# Patient Record
Sex: FEMALE | Race: WHITE
[De-identification: names, ages, dates, MRNs, and addresses within clinical notes are randomized per-mention and may not be internally consistent; named-entity substitution may affect disease eponyms.]

---

## 2018-08-27 ENCOUNTER — HOSPITAL ENCOUNTER (EMERGENCY)
Dept: HOSPITAL 45 - C.EDB | Age: 38
Discharge: HOME | End: 2018-08-27
Payer: COMMERCIAL

## 2018-08-27 VITALS — HEART RATE: 82 BPM | SYSTOLIC BLOOD PRESSURE: 130 MMHG | DIASTOLIC BLOOD PRESSURE: 96 MMHG | OXYGEN SATURATION: 100 %

## 2018-08-27 VITALS
HEIGHT: 67.01 IN | BODY MASS INDEX: 26.44 KG/M2 | WEIGHT: 168.43 LBS | BODY MASS INDEX: 26.44 KG/M2 | WEIGHT: 168.43 LBS | HEIGHT: 67.01 IN

## 2018-08-27 VITALS — TEMPERATURE: 98.6 F
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
## 2018-08-27 DIAGNOSIS — M25.461: ICD-10-CM

## 2018-08-27 DIAGNOSIS — Z79.899: ICD-10-CM

## 2018-08-27 DIAGNOSIS — F32.9: ICD-10-CM

## 2018-08-27 DIAGNOSIS — S89.91XA: Primary | ICD-10-CM

## 2018-08-27 DIAGNOSIS — E78.5: ICD-10-CM

## 2018-08-27 DIAGNOSIS — Z79.3: ICD-10-CM
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
## 2018-08-27 NOTE — EMERGENCY ROOM VISIT NOTE
History


First contact with patient:  17:29


Chief Complaint:  KNEEPAIN


Stated Complaint:  R KNEE INJURY





History of Present Illness


The patient is a 38 year old female who presents to the Emergency Room with 

complaints of right knee pain since the patient had an accident playing 

softball 2 days ago.  She fell landing directly on the right knee.  She is able 

to walk and bear weight.  The pain is worse with any flexion.  She has taken 

Tylenol with minimal relief of her symptoms.  She has not injured this knee 

before.  She is experiencing some pain into the right quad and right hip.





Review of Systems


6 system review negative. Please see pertinent positives in the history of 

present illness section.





Past Medical/Surgical History


Medical Problems:


(1) Endometriosis


(2) Migraines


(3) Vertigo


Hyperlipidemia


Depression





Family History





FHx: cancer


FHx: diabetes





Social History


Smoking Status:  Never Smoker


Alcohol Use:  none


Drug Use:  none


Marital Status:  single


Housing Status:  lives with family


Occupation Status:  employed





Current/Historical Medications


Scheduled


Atorvastatin (Lipitor), 20 MG PO DAILY


Birth Control Pills (Birth Control Pills), 1 TAB PO DAILY


Venlafaxine Hcl (Venlafaxine Hcl Er), 150 MG PO DAILY





Scheduled PRN


Lidocaine (Lidocaine), 1 PATCH TD QD PRN for Pain





Physical Exam


Vital Signs











  Date Time  Temp Pulse Resp B/P (MAP) Pulse Ox O2 Delivery O2 Flow Rate FiO2


 


8/27/18 20:11  82 17 130/96 100   


 


8/27/18 17:19 37.0 100 16 116/84 98 Room Air  











Physical Exam


VITALS: Vitals are noted on the nurse's note and reviewed by myself.  Vital 

signs stable.


GENERAL: 38-year-old female, in mild discomfort,, in no acute distress, 

nondiaphoretic, well-developed well-nourished.


SKIN: The skin was without rashes, erythema, edema, or bruising. .


HEAD: Normocephalic atraumatic.  


MUSCULOSKELETAL: 


RIGHT lower extremity: Pain with flexion and extension of the right knee.  

Patellar tendon intact.  Pain over the quadricep tendon noted.  Pain also over 

the medial joint line.  No ligamentous instability appreciated.  There is pain 

over the right hip flexor.  No pain over the IT band noted.  No pain with 

abduction.  Pain with flexion of the right hip.  DP pulse +2.  Capillary refill 

in the toes is less than 2 seconds.


NEURO: Patient was alert and oriented to person place and time.  Normal 

sensation to touch. No focal neurological deficits.





Medical Decision & Procedures


ER Provider


Diagnostic Interpretation:


Right knee x-rays


IMPRESSION: No definite fracture. Small-to-moderate right knee joint effusion


with equivocal lipohemarthrosis which is probably artifactual. However, if


persistent right knee pain, a CT could be obtained to exclude an occult


fracture.











Electronically signed by:  Uli Asher M.D.


8/27/2018 6:50 PM





Dictated Date/Time:  8/27/2018 6:46 PM





 The status of this report is Signed.   


 Draft = Not yet reviewed or approved by Radiologist.  


Signed = Reviewed and approved by Radiologist.





CT of the right lower extremity without contrast


IMPRESSION:  





1. No acute fracture.





2. Small right knee joint effusion.





3. Small amount of fluid overlying the medial head of the gastrocnemius which is


suboptimally assessed by CT but may reflect a muscle strain.





4. Mild lateral patellar tilt. 














Electronically signed by:  Uli Asher M.D.


8/27/2018 7:43 PM





Dictated Date/Time:  8/27/2018 7:35 PM





 The status of this report is Signed.   


 Draft = Not yet reviewed or approved by Radiologist.  


 Signed = Reviewed and approved by Radiologist.





Medications Administered











 Medications


  (Trade)  Dose


 Ordered  Sig/Destiney


 Route  Start Time


 Stop Time Status Last Admin


Dose Admin


 


 Ibuprofen


  (Advil Tab)  800 mg  NOW  STAT


 PO  8/27/18 17:44


 8/27/18 17:46 DC 8/27/18 17:52


800 MG











ED Course


The patient was seen and examined


She was medicated with ibuprofen 800 mg p.o.


Imaging was performed and reviewed


Upon reassessment, the patient was resting comfortably.  We discussed her 

results.  She voiced understanding, was comfortable being discharged home.


Discharge instructions were reviewed, and she was discharged in good condition





Medical Decision


Differential diagnosis: Knee sprain, ligamentous tear, effusion, fracture, 

dislocation, hip injury among others were entertained





This patient is a 38-year-old female presents to the emergency department with 

an injury to the right knee.  On exam, she was mildly tender over the joint 

line.  She had some mild edema.  I cannot elicit any significant abnormalities 

with the ligaments.  Her imaging showed a possible fracture and effusion.  CT 

confirmed no fracture.  She likely has a strain.  She also has a small 

effusion.  Patient will be treated with a knee immobilizer and crutches.  If 

there is no improvement in 7 days, she will either follow up with her primary 

care physician or an orthopedic doctor.  She was comfortable with this plan.





This chart was completed in part utilizing Dragon Speech Voice Recognition 

software. Attempts were made to minimize the grammatical errors, random word 

insertions, pronoun errors and incomplete sentences. Any formal questions or 

concerns about the content, text or information contained within the body of 

this dictation should be directly addressed to the provider for clarification.





Impression





 Primary Impression:  


 Right knee injury





Departure Information


Dispostion


Home / Self-Care





Condition


GOOD





Referrals


Tony Oden M.D.([facility]) (PCP)








Martell Suh M.D.





Patient Instructions


My [facility]





Additional Instructions





Please use crutches and knee immobilizer for the next 7 days.  If in 7 days, 

there is no improvement, please either follow-up with your primary care 

physician or an orthopedic doctor.  A number has been provided





Ibuprofen 600 mg every 6 hours.  Do not exceed 2400 mg in a 24-hour period.





Please also rest, ice for 20 minute intervals and elevate the leg as much as 

possible.





No strenuous activity until you are feeling better





Do not hesitate to return to the emergency department with any new or 

concerning symptoms





It was a pleasure participating in your care today

## 2018-08-27 NOTE — DIAGNOSTIC IMAGING REPORT
R KNEE 3 VIEWS



CLINICAL HISTORY: Medial right knee pain.    



COMPARISON: None



FINDINGS:  Alignment of the right knee is anatomic. No acute fracture or osseous

lesion is noted. A small to moderate right knee joint effusion is noted. Joint

spaces are preserved. An equivocal lipohemarthrosis is probably artifactual.



IMPRESSION: No definite fracture. Small-to-moderate right knee joint effusion

with equivocal lipohemarthrosis which is probably artifactual. However, if

persistent right knee pain, a CT could be obtained to exclude an occult

fracture.







Electronically signed by:  Uli Asher M.D.

8/27/2018 6:50 PM



Dictated Date/Time:  8/27/2018 6:46 PM

## 2018-08-27 NOTE — DIAGNOSTIC IMAGING REPORT
CT OF THE RIGHT KNEE WITHOUT CONTRAST



CLINICAL HISTORY: Medial right knee pain following injury.    



COMPARISON STUDY:  Right knee radiographs August 27, 2018.



TECHNIQUE: Axial images of the right knee were obtained without IV contrast.

Sagittal and coronal reconstructions were viewed.



FINDINGS: Mild lateral patellar tilt is noted. Alignment of the right knee is

otherwise anatomic. There is a small right knee joint effusion without evidence

for a lipohemarthrosis. No acute fracture is identified. There is no suspicious

osseous lesion. Joint spaces are preserved. Intrinsic ligaments are suboptimally

assessed by CT. There is a small amount of fluid/edema overlying the medial head

of the gastrocnemius. This is suboptimally assessed by CT.



IMPRESSION:  



1. No acute fracture.



2. Small right knee joint effusion.



3. Small amount of fluid overlying the medial head of the gastrocnemius which is

suboptimally assessed by CT but may reflect a muscle strain.



4. Mild lateral patellar tilt. 









Electronically signed by:  Uli Asher M.D.

8/27/2018 7:43 PM



Dictated Date/Time:  8/27/2018 7:35 PM